# Patient Record
Sex: MALE | Race: WHITE | NOT HISPANIC OR LATINO | Employment: OTHER | URBAN - METROPOLITAN AREA
[De-identification: names, ages, dates, MRNs, and addresses within clinical notes are randomized per-mention and may not be internally consistent; named-entity substitution may affect disease eponyms.]

---

## 2022-07-05 ENCOUNTER — APPOINTMENT (EMERGENCY)
Dept: RADIOLOGY | Facility: HOSPITAL | Age: 42
End: 2022-07-05
Payer: COMMERCIAL

## 2022-07-05 ENCOUNTER — HOSPITAL ENCOUNTER (EMERGENCY)
Facility: HOSPITAL | Age: 42
Discharge: HOME | End: 2022-07-05
Attending: EMERGENCY MEDICINE
Payer: COMMERCIAL

## 2022-07-05 VITALS
HEART RATE: 90 BPM | TEMPERATURE: 98.1 F | OXYGEN SATURATION: 98 % | RESPIRATION RATE: 18 BRPM | DIASTOLIC BLOOD PRESSURE: 103 MMHG | WEIGHT: 179 LBS | SYSTOLIC BLOOD PRESSURE: 148 MMHG

## 2022-07-05 DIAGNOSIS — S60.211A CONTUSION OF RIGHT WRIST: Primary | ICD-10-CM

## 2022-07-05 PROCEDURE — 99284 EMERGENCY DEPT VISIT MOD MDM: CPT | Performed by: EMERGENCY MEDICINE

## 2022-07-05 PROCEDURE — 99283 EMERGENCY DEPT VISIT LOW MDM: CPT

## 2022-07-05 PROCEDURE — 73110 X-RAY EXAM OF WRIST: CPT

## 2022-07-05 RX ORDER — IBUPROFEN 600 MG/1
600 TABLET ORAL ONCE
Status: COMPLETED | OUTPATIENT
Start: 2022-07-05 | End: 2022-07-05

## 2022-07-05 RX ORDER — ACETAMINOPHEN 325 MG/1
975 TABLET ORAL ONCE
Status: COMPLETED | OUTPATIENT
Start: 2022-07-05 | End: 2022-07-05

## 2022-07-05 RX ADMIN — ACETAMINOPHEN 975 MG: 325 TABLET, FILM COATED ORAL at 17:23

## 2022-07-05 RX ADMIN — IBUPROFEN 600 MG: 600 TABLET ORAL at 17:23

## 2022-07-05 NOTE — ED PROVIDER NOTES
History  Chief Complaint   Patient presents with    Wrist Injury     Patient states that he was playing football this morning and caught the football to his right wrist  Swelling and bruising and pain noted to right wrist       50-year-old male presenting to the emergency department for evaluation of pain, ecchymosis, and swelling at the right distal forearm/wrist   Patient states that he was playing football, was catching a football which also struck him in the wrist   Patient has had progressive swelling  None       History reviewed  No pertinent past medical history  History reviewed  No pertinent surgical history  History reviewed  No pertinent family history  I have reviewed and agree with the history as documented  E-Cigarette/Vaping     E-Cigarette/Vaping Substances     Social History     Tobacco Use    Smoking status: Current Every Day Smoker    Smokeless tobacco: Never Used   Substance Use Topics    Alcohol use: Never    Drug use: Never       Review of Systems   Musculoskeletal:        Right wrist pain  Skin: Positive for color change  Negative for rash and wound  Neurological: Negative for weakness and numbness  Physical Exam  Physical Exam  Vitals reviewed  Constitutional:       Appearance: Normal appearance  HENT:      Head: Normocephalic and atraumatic  Right Ear: External ear normal       Left Ear: External ear normal       Nose: Nose normal    Eyes:      Extraocular Movements: Extraocular movements intact  Conjunctiva/sclera: Conjunctivae normal    Cardiovascular:      Pulses:           Radial pulses are 2+ on the right side  Comments: Good capillary fill of 5 fingers on the right hand  Musculoskeletal:      Right wrist: Swelling and tenderness present  No deformity  Right hand: No swelling, deformity or tenderness  Normal range of motion  Skin:     General: Skin is warm  Capillary Refill: Capillary refill takes less than 2 seconds  Neurological:      Mental Status: He is alert  Sensory: Sensation is intact  Motor: Motor function is intact  Vital Signs  ED Triage Vitals [07/05/22 1544]   Temperature Pulse Respirations Blood Pressure SpO2   98 1 °F (36 7 °C) 90 18 (!) 148/103 98 %      Temp Source Heart Rate Source Patient Position - Orthostatic VS BP Location FiO2 (%)   Temporal Monitor Sitting Left arm --      Pain Score       6           Vitals:    07/05/22 1544   BP: (!) 148/103   Pulse: 90   Patient Position - Orthostatic VS: Sitting         Visual Acuity      ED Medications  Medications   ibuprofen (MOTRIN) tablet 600 mg (600 mg Oral Given 7/5/22 1723)   acetaminophen (TYLENOL) tablet 975 mg (975 mg Oral Given 7/5/22 1723)       Diagnostic Studies  Results Reviewed     None                 XR wrist 3+ views RIGHT   ED Interpretation by Jose Nguyen MD (07/05 1821)   No fracture  Procedures  Procedures         ED Course                                             MDM    Disposition  Final diagnoses:   Contusion of right wrist     Time reflects when diagnosis was documented in both MDM as applicable and the Disposition within this note     Time User Action Codes Description Comment    7/5/2022  6:22 PM Mardell Severs Contusion of right wrist       ED Disposition     ED Disposition   Discharge    Condition   Stable    Date/Time   Tue Jul 5, 2022  6:23 PM    Comment   Janine Nolan discharge to home/self care  Follow-up Information     Follow up With Specialties Details Why Contact Info Additional [id number] [facility] Go to  Follow-up with orthopedics as needed   [address] [id number]  [address], [address], [place], [address]  Use Entrance A           There are no discharge medications for this patient  No discharge procedures on file      PDMP Review     None          ED Provider  Electronically Signed by           Shirley Phan MD  07/05/22 5035

## 2022-07-05 NOTE — Clinical Note
Jeanette Hernandez was seen and treated in our emergency department on 7/5/2022  Diagnosis:     Sachin Selby  may return to work on return date  He may return on this date: 07/06/2022         If you have any questions or concerns, please don't hesitate to call        Mason Rowe MD    ______________________________           _______________          _______________  [facility] [place]                              Date                                Time

## 2025-08-11 ENCOUNTER — OFFICE VISIT (OUTPATIENT)
Dept: FAMILY MEDICINE CLINIC | Facility: CLINIC | Age: 45
End: 2025-08-11
Payer: COMMERCIAL

## 2025-08-11 PROBLEM — M54.42 CHRONIC LEFT-SIDED LOW BACK PAIN WITH LEFT-SIDED SCIATICA: Status: ACTIVE | Noted: 2025-08-11

## 2025-08-11 PROBLEM — M54.2 CHRONIC NECK PAIN: Status: ACTIVE | Noted: 2025-08-11

## 2025-08-11 PROBLEM — G89.29 CHRONIC NECK PAIN: Status: ACTIVE | Noted: 2025-08-11

## 2025-08-11 PROBLEM — G89.29 CHRONIC LEFT-SIDED LOW BACK PAIN WITH LEFT-SIDED SCIATICA: Status: ACTIVE | Noted: 2025-08-11

## 2025-08-11 PROBLEM — G62.9 NEUROPATHY: Status: ACTIVE | Noted: 2025-08-11

## 2025-08-13 ENCOUNTER — NURSE TRIAGE (OUTPATIENT)
Age: 45
End: 2025-08-13